# Patient Record
Sex: MALE | Race: WHITE | NOT HISPANIC OR LATINO | URBAN - METROPOLITAN AREA
[De-identification: names, ages, dates, MRNs, and addresses within clinical notes are randomized per-mention and may not be internally consistent; named-entity substitution may affect disease eponyms.]

---

## 2021-01-06 ENCOUNTER — OUTPATIENT (OUTPATIENT)
Dept: OUTPATIENT SERVICES | Facility: HOSPITAL | Age: 36
LOS: 1 days | End: 2021-01-06

## 2022-08-09 ENCOUNTER — OUTPATIENT (OUTPATIENT)
Dept: OUTPATIENT SERVICES | Facility: HOSPITAL | Age: 37
LOS: 1 days | End: 2022-08-09

## 2022-08-09 PROCEDURE — 93010 ELECTROCARDIOGRAM REPORT: CPT

## 2022-08-10 DIAGNOSIS — Z01.810 ENCOUNTER FOR PREPROCEDURAL CARDIOVASCULAR EXAMINATION: ICD-10-CM

## 2022-08-10 DIAGNOSIS — K42.0 UMBILICAL HERNIA WITH OBSTRUCTION, WITHOUT GANGRENE: ICD-10-CM

## 2022-08-10 DIAGNOSIS — Z01.812 ENCOUNTER FOR PREPROCEDURAL LABORATORY EXAMINATION: ICD-10-CM

## 2022-08-23 PROBLEM — Z00.00 ENCOUNTER FOR PREVENTIVE HEALTH EXAMINATION: Status: ACTIVE | Noted: 2022-08-23

## 2022-08-24 ENCOUNTER — NON-APPOINTMENT (OUTPATIENT)
Age: 37
End: 2022-08-24

## 2022-08-24 ENCOUNTER — APPOINTMENT (OUTPATIENT)
Dept: CARDIOLOGY | Facility: CLINIC | Age: 37
End: 2022-08-24

## 2022-08-24 VITALS
DIASTOLIC BLOOD PRESSURE: 86 MMHG | HEIGHT: 74 IN | HEART RATE: 126 BPM | BODY MASS INDEX: 31.44 KG/M2 | WEIGHT: 245 LBS | SYSTOLIC BLOOD PRESSURE: 132 MMHG | OXYGEN SATURATION: 99 %

## 2022-08-24 DIAGNOSIS — Z78.9 OTHER SPECIFIED HEALTH STATUS: ICD-10-CM

## 2022-08-24 DIAGNOSIS — N02.8 RECURRENT AND PERSISTENT HEMATURIA WITH OTHER MORPHOLOGIC CHANGES: ICD-10-CM

## 2022-08-24 DIAGNOSIS — Z82.49 FAMILY HISTORY OF ISCHEMIC HEART DISEASE AND OTHER DISEASES OF THE CIRCULATORY SYSTEM: ICD-10-CM

## 2022-08-24 DIAGNOSIS — Z87.19 PERSONAL HISTORY OF OTHER DISEASES OF THE DIGESTIVE SYSTEM: ICD-10-CM

## 2022-08-24 DIAGNOSIS — K90.0 CELIAC DISEASE: ICD-10-CM

## 2022-08-24 DIAGNOSIS — Z86.59 PERSONAL HISTORY OF OTHER MENTAL AND BEHAVIORAL DISORDERS: ICD-10-CM

## 2022-08-24 PROCEDURE — 93000 ELECTROCARDIOGRAM COMPLETE: CPT | Mod: NC

## 2022-08-24 PROCEDURE — 99204 OFFICE O/P NEW MOD 45 MIN: CPT | Mod: 25

## 2022-08-24 RX ORDER — DEXTROAMPHETAMINE SACCHARATE, AMPHETAMINE ASPARTATE, DEXTROAMPHETAMINE SULFATE, AND AMPHETAMINE SULFATE 7.5; 7.5; 7.5; 7.5 MG/1; MG/1; MG/1; MG/1
30 TABLET ORAL
Refills: 0 | Status: ACTIVE | COMMUNITY

## 2022-08-24 RX ORDER — ALPRAZOLAM 1 MG/1
1 TABLET ORAL
Refills: 0 | Status: ACTIVE | COMMUNITY

## 2022-08-24 NOTE — PHYSICAL EXAM
[Normal] : moves all extremities, no focal deficits, normal speech [de-identified] : No carotid bruits auscultated bilaterally [de-identified] : regular, tachycardia. Normal S1 and S2, no murmurs

## 2022-08-24 NOTE — DISCUSSION/SUMMARY
[FreeTextEntry1] : 1. Sinus Tachycardia: asymptomatic. Recommend 3 Day Zio to assess average HR. Recommend echocardiogram. Reviewed labs from 8/9/2022. No anemia. His Hb is slightly elevated at 17.3. Recommend PCP/hematology evaluation regarding this finding (non-smoker). Electrolytes were within normal limits. Recommend TSH (lab slip provided).\par \par 2. HTN: for now continue olmesartan/amlodipine/HCTZ 40/10/25mg daily. Goal BP less than 130/80. If Zio patch returns with significant sustained sinus tachycardia would consider adjusting BP medication to include one with AV chad blocking properties. \par \par Patient is not optimized from a cardiology standpoint to proceed with his umbilical hernia surgery. He will have the above testing and has a follow up with me to discuss results and adjust medications if necessary.  [EKG obtained to assist in diagnosis and management of assessed problem(s)] : EKG obtained to assist in diagnosis and management of assessed problem(s)

## 2022-08-24 NOTE — HISTORY OF PRESENT ILLNESS
[FreeTextEntry1] : 37 year old male with PMHx of ADHD, IgA nephropathy, celiac disease, HTN  umbilical hernia presents for a cardiac evaluation prior to his umbilical hernia surgery on [facility]. The surgery was scheduled for last Friday, however, his doctor did not clear him because his HR was elevated. 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 He has been out of work for the umbilical hernia and since he has been out of work the past week his watch has noted sustained tachycardia >110bpm. Patient has no chest pain, dyspnea, or palpitations. No lightheadedness or syncope.  He stopped his Adderall the past day and eliminated caffeine from his diet and his HR is still elevated. \par \par There is no history of MI, CVA, CHF, or previous coronary intervention.\par

## 2022-08-24 NOTE — REVIEW OF SYSTEMS
[Negative] : Neurological [FreeTextEntry5] : see HPI [FreeTextEntry7] : see HPI [de-identified] : see HPI

## 2022-09-02 ENCOUNTER — APPOINTMENT (OUTPATIENT)
Dept: CARDIOLOGY | Facility: CLINIC | Age: 37
End: 2022-09-02

## 2022-09-02 PROCEDURE — 93244 EXT ECG>48HR<7D REV&INTERPJ: CPT

## 2022-09-07 ENCOUNTER — TRANSCRIPTION ENCOUNTER (OUTPATIENT)
Age: 37
End: 2022-09-07

## 2022-09-19 ENCOUNTER — APPOINTMENT (OUTPATIENT)
Dept: CARDIOLOGY | Facility: CLINIC | Age: 37
End: 2022-09-19

## 2022-09-19 PROCEDURE — 93306 TTE W/DOPPLER COMPLETE: CPT

## 2022-09-20 ENCOUNTER — APPOINTMENT (OUTPATIENT)
Dept: CARDIOLOGY | Facility: CLINIC | Age: 37
End: 2022-09-20

## 2022-09-20 VITALS
HEIGHT: 74 IN | OXYGEN SATURATION: 98 % | BODY MASS INDEX: 31.44 KG/M2 | HEART RATE: 118 BPM | WEIGHT: 245 LBS | SYSTOLIC BLOOD PRESSURE: 120 MMHG | DIASTOLIC BLOOD PRESSURE: 76 MMHG

## 2022-09-20 DIAGNOSIS — E78.00 PURE HYPERCHOLESTEROLEMIA, UNSPECIFIED: ICD-10-CM

## 2022-09-20 DIAGNOSIS — Z01.810 ENCOUNTER FOR PREPROCEDURAL CARDIOVASCULAR EXAMINATION: ICD-10-CM

## 2022-09-20 PROCEDURE — 99214 OFFICE O/P EST MOD 30 MIN: CPT

## 2022-09-20 RX ORDER — METOPROLOL SUCCINATE 50 MG/1
50 TABLET, EXTENDED RELEASE ORAL
Qty: 90 | Refills: 3 | Status: ACTIVE | COMMUNITY
Start: 2022-09-20 | End: 1900-01-01

## 2022-09-20 RX ORDER — OLMESARTAN MEDOXOMIL / AMLODIPINE BESYLATE / HYDROCHLOROTHIAZIDE 40; 10; 25 MG/1; MG/1; MG/1
40-10-25 TABLET, FILM COATED ORAL DAILY
Refills: 0 | Status: DISCONTINUED | COMMUNITY
End: 2022-09-20

## 2022-09-20 RX ORDER — AMLODIPINE AND VALSARTAN 10; 320 MG/1; MG/1
10-320 TABLET, FILM COATED ORAL DAILY
Qty: 90 | Refills: 3 | Status: ACTIVE | COMMUNITY
Start: 2022-09-20 | End: 1900-01-01

## 2022-09-20 NOTE — DISCUSSION/SUMMARY
[FreeTextEntry1] : 1. Sinus Tachycardia: asymptomatic. Recommended 3 Day Zio to assess average HR. Average HR was 112bpm. Echocardiogram demonstrated structurally normal heart.  Reviewed labs from 8/9/2022. No anemia. His Hb is slightly elevated at 17.3. Electrolytes were within normal limits. TSH within normal limits. Patient states he has heavy snoring. He states he had home sleep study in the past which resulted in no significant MARY. Given the sinus tachycardia, elevated Hb, and heavy snoring, I am recommending sleep specialist evaluation. I would recommend treating the ST. I would recommend discontinuing the HCTZ. Recommend starting Toprol XL 50mg daily. \par \par 2. HTN: given the ST, recommend d/c amlodipine/olmesartan/HCTZ. Start amlodipine/valsartan 10/320mg daily and Toprol XL 50mg daily.  Goal BP less than 130/80.\par \par Patient is optimized from a cardiology standpoint to proceed with his umbilical hernia surgery. He should remain on beta blocker therapy throughout the periprocedural period. \par \par Follow up in 2 months.

## 2022-09-20 NOTE — REVIEW OF SYSTEMS
[Negative] : Neurological [FreeTextEntry5] : see HPI [FreeTextEntry7] : see HPI [de-identified] : see HPI

## 2022-09-20 NOTE — CARDIOLOGY SUMMARY
[de-identified] : 8/24/2022, Sinus Tachycardia (HR 117bpm) [de-identified] : 8/24/2022, NSR-ST with average HR 112bpm [de-identified] : 9/19/2020, LV EF 55-60%, trace MR, trace TR.

## 2022-09-20 NOTE — PHYSICAL EXAM
[Normal] : moves all extremities, no focal deficits, normal speech [de-identified] : No carotid bruits auscultated bilaterally [de-identified] : regular, tachycardia. Normal S1 and S2, no murmurs

## 2022-11-15 ENCOUNTER — APPOINTMENT (OUTPATIENT)
Dept: CARDIOLOGY | Facility: CLINIC | Age: 37
End: 2022-11-15

## 2022-11-15 VITALS
HEART RATE: 92 BPM | DIASTOLIC BLOOD PRESSURE: 76 MMHG | BODY MASS INDEX: 31.44 KG/M2 | WEIGHT: 245 LBS | HEIGHT: 74 IN | SYSTOLIC BLOOD PRESSURE: 114 MMHG | OXYGEN SATURATION: 97 %

## 2022-11-15 DIAGNOSIS — Z79.899 OTHER LONG TERM (CURRENT) DRUG THERAPY: ICD-10-CM

## 2022-11-15 DIAGNOSIS — R00.0 TACHYCARDIA, UNSPECIFIED: ICD-10-CM

## 2022-11-15 DIAGNOSIS — I10 ESSENTIAL (PRIMARY) HYPERTENSION: ICD-10-CM

## 2022-11-15 PROCEDURE — 99215 OFFICE O/P EST HI 40 MIN: CPT

## 2022-11-15 NOTE — CARDIOLOGY SUMMARY
[de-identified] : 8/24/2022, Sinus Tachycardia (HR 117bpm) [de-identified] : 8/24/2022, NSR-ST with average HR 112bpm [de-identified] : 9/19/2020, LV EF 55-60%, trace MR, trace TR.

## 2022-11-15 NOTE — DISCUSSION/SUMMARY
[FreeTextEntry1] : 1. Sinus Tachycardia: asymptomatic. Recommended 3 Day Zio to assess average HR. Average HR was 112bpm. Echocardiogram demonstrated structurally normal heart.  Reviewed labs from 8/9/2022. No anemia. His Hb was slightly elevated at 17.3. Electrolytes were within normal limits. TSH within normal limits. Patient states he has heavy snoring. He states he had home sleep study in the past which resulted in no significant MARY. Given the sinus tachycardia, elevated Hb, and heavy snoring, I am recommending sleep specialist evaluation. I would recommend treating the ST. I would recommend discontinuing the HCTZ. Recommended starting Toprol XL 50mg daily (high risk medication with no signs of toxicity). ST improved. \par \par 2. HTN: given the ST, recommended d/c amlodipine/olmesartan/HCTZ. Started amlodipine/valsartan 10/320mg daily and Toprol XL 50mg daily.  Goal BP less than 130/80.\par \par Follow up in 12 months.

## 2022-11-15 NOTE — PHYSICAL EXAM
[Normal] : moves all extremities, no focal deficits, normal speech [de-identified] : No carotid bruits auscultated bilaterally

## 2022-11-15 NOTE — REVIEW OF SYSTEMS
[Negative] : Neurological [FreeTextEntry5] : see HPI [FreeTextEntry7] : see HPI [de-identified] : see HPI

## 2023-11-07 ENCOUNTER — APPOINTMENT (OUTPATIENT)
Dept: CARDIOLOGY | Facility: CLINIC | Age: 38
End: 2023-11-07

## 2024-01-01 NOTE — HISTORY OF PRESENT ILLNESS
[FreeTextEntry1] : Historical Perspective:\par 37 year old male with PMHx of ADHD, IgA nephropathy, celiac disease, HTN  umbilical hernia presents for a cardiac evaluation prior to his umbilical hernia surgery on [facility]. The surgery was scheduled for last Friday, however, his doctor did not clear him because his HR was elevated. 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 He has been out of work for the umbilical hernia and since he has been out of work the past week his watch has noted sustained tachycardia >110bpm. Patient has no chest pain, dyspnea, or palpitations. No lightheadedness or syncope.  He stopped his Adderall the past day and eliminated caffeine from his diet and his HR is still elevated. \par \par There is no history of MI, CVA, CHF, or previous coronary intervention.\par \par Current Health Status:\par Patient with no chest pain, SOB, or palpitations. Remains compliant with his medications and reports no adverse effects. Patient underwent his elective hernia repair without incident.\par \par 
Yes